# Patient Record
Sex: FEMALE | Race: WHITE | NOT HISPANIC OR LATINO | URBAN - METROPOLITAN AREA
[De-identification: names, ages, dates, MRNs, and addresses within clinical notes are randomized per-mention and may not be internally consistent; named-entity substitution may affect disease eponyms.]

---

## 2020-12-21 ENCOUNTER — OUTPATIENT (OUTPATIENT)
Dept: OUTPATIENT SERVICES | Facility: HOSPITAL | Age: 55
LOS: 1 days | End: 2020-12-21
Payer: COMMERCIAL

## 2020-12-21 DIAGNOSIS — Z20.828 CONTACT WITH AND (SUSPECTED) EXPOSURE TO OTHER VIRAL COMMUNICABLE DISEASES: ICD-10-CM

## 2020-12-21 PROCEDURE — U0003: CPT

## 2020-12-22 DIAGNOSIS — Z20.828 CONTACT WITH AND (SUSPECTED) EXPOSURE TO OTHER VIRAL COMMUNICABLE DISEASES: ICD-10-CM

## 2020-12-23 LAB — SARS-COV-2 RNA SPEC QL NAA+PROBE: SIGNIFICANT CHANGE UP

## 2020-12-28 ENCOUNTER — EMERGENCY (EMERGENCY)
Facility: HOSPITAL | Age: 55
LOS: 0 days | Discharge: HOME | End: 2020-12-28
Attending: EMERGENCY MEDICINE
Payer: COMMERCIAL

## 2020-12-28 ENCOUNTER — OUTPATIENT (OUTPATIENT)
Dept: OUTPATIENT SERVICES | Facility: HOSPITAL | Age: 55
LOS: 1 days | End: 2020-12-28
Payer: COMMERCIAL

## 2020-12-28 VITALS — WEIGHT: 130.07 LBS | HEIGHT: 67 IN

## 2020-12-28 VITALS
HEART RATE: 73 BPM | OXYGEN SATURATION: 97 % | RESPIRATION RATE: 18 BRPM | DIASTOLIC BLOOD PRESSURE: 53 MMHG | SYSTOLIC BLOOD PRESSURE: 103 MMHG | TEMPERATURE: 98 F

## 2020-12-28 DIAGNOSIS — Z20.828 CONTACT WITH AND (SUSPECTED) EXPOSURE TO OTHER VIRAL COMMUNICABLE DISEASES: ICD-10-CM

## 2020-12-28 DIAGNOSIS — R07.9 CHEST PAIN, UNSPECIFIED: ICD-10-CM

## 2020-12-28 DIAGNOSIS — U07.1 COVID-19: ICD-10-CM

## 2020-12-28 LAB — SARS-COV-2 RNA SPEC QL NAA+PROBE: DETECTED

## 2020-12-28 PROCEDURE — U0003: CPT

## 2020-12-28 PROCEDURE — C9803: CPT

## 2020-12-28 PROCEDURE — 93005 ELECTROCARDIOGRAM TRACING: CPT

## 2020-12-28 PROCEDURE — 71045 X-RAY EXAM CHEST 1 VIEW: CPT | Mod: 26

## 2020-12-28 PROCEDURE — 99283 EMERGENCY DEPT VISIT LOW MDM: CPT

## 2020-12-28 PROCEDURE — 71045 X-RAY EXAM CHEST 1 VIEW: CPT

## 2020-12-28 PROCEDURE — 93010 ELECTROCARDIOGRAM REPORT: CPT

## 2020-12-28 NOTE — ED STATDOCS - NSFOLLOWUPINSTRUCTIONS_ED_ALL_ED_FT
What is COVID-19?  Coronavirus disease 2019, or "COVID-19," is an infection caused by a specific virus called SARS-CoV-2. The virus first appeared in late 2019 in the city of [place], [place]. But it has spread quickly since then, and there are now cases in many other places, including [place] and the [place].    People with COVID-19 can have fever, cough, and trouble breathing. Problems with breathing happen when the infection affects the lungs and causes pneumonia (figure 1).    Experts are studying this virus and will continue to learn more about it over time.    How is COVID-19 spread?  COVID-19 mainly spreads from person to person, similar to the flu. This usually happens when a sick person coughs or sneezes near other people. Doctors also think it is possible to get sick if you touch a surface that has the virus on it and then touch your mouth, nose, or eyes.    From what experts know so far, COVID-19 seems to spread most easily when people are showing symptoms. It is possible to spread it without having symptoms, too, but experts don't know how often this happens.    What are the symptoms of COVID-19?  Symptoms usually start a few days after a person is infected with the virus. But in some people it can take even longer for symptoms to appear.    Symptoms can include:    ?Fever    ?Cough    ?Feeling tired    ?Trouble breathing    ?Muscle aches    Although it is less common, some people have other symptoms, such as headache, sore throat, runny nose, or problems with their sense of smell. Some have digestive problems like nausea or diarrhea.    Most people have mild symptoms. Some people have no symptoms at all. But in other people, COVID-19 can lead to serious problems like pneumonia, not getting enough oxygen, heart problems, or even death. This is more common in people who are older or have other health problems like heart disease, diabetes, lung disease, or cancer.    While children can get COVID-19, they seem less likely to have severe symptoms.    Should I see a doctor or nurse?  If you have a fever, cough, or trouble breathing and might have been exposed to COVID-19, call your doctor or nurse. You might have been exposed if any of the following happened within the last 14 days:    ?You had close contact with a person who has the virus – This generally means being within about 6 feet of the person.    ?You lived in, or traveled to, an area where lots of people have the virus – The [place] Centers for Disease Control and Prevention (CDC) has information about which areas are affected. This can be found on their website: www.cdc.gov/coronavirus/2019-ncov/travelers/index.html.    If your symptoms are not severe, it is best to call your doctor, nurse, or clinic before you go in. They can tell you what to do and whether you need to be seen in person. Many people with only mild symptoms can stay home, and away from other people, until they get better. If you do need to go to the clinic or hospital, you will need to put on a face mask. The staff might also have you wait someplace away from other people.    If you are severely ill and need to go to the clinic or hospital right away, you should still call ahead. This way the staff can care for you while taking steps to protect others.    Your doctor or nurse will do an exam and ask about your symptoms. They will also ask questions about any recent travel and whether you have been around anyone who might be sick.    Will I need tests?  If your doctor or nurse suspects you have COVID-19, they might take a sample of fluid from inside your nose, and possibly your mouth, and send it to a lab for testing. If you are coughing up mucus, they might also test a sample of the mucus. These tests can show if you have COVID-19 or another infection.    In some areas, it might not be possible to test everyone who might have been exposed to the virus. If your doctor cannot test you, they might tell you to stay home and away from other people, and call if your symptoms get worse.    Your doctor might also order a chest X-ray or computed tomography (CT) scan to check your lungs.    How is COVID-19 treated?  There is no specific treatment for COVID-19. Many people will be able to stay home while they get better, but people with serious symptoms or other health problems might need to go to the hospital:    ?Mild illness – Most people with COVID-19 have only mild illness and can rest at home until they get better. People with mild symptoms seem to get better after about 2 weeks, but it's not the same for everyone.    If you are recovering from COVID-19, it's important to stay home, and away from other people, until your doctor or nurse tells you it's safe to go back to your normal activities. This decision will depend on how long it has been since you had symptoms, and in some cases, whether you have had a negative test (showing that the virus is no longer in your body).    ?Severe illness – If you have more severe illness, you might need to stay in the hospital, possibly in the intensive care unit (also called the "ICU"). While you are there, you will most likely be in a special "isolation" room. Only medical staff will be allowed in the room, and they will have to wear special gowns, gloves, masks, and eye protection.    The doctors and nurses can monitor and support your breathing and other body functions and make you as comfortable as possible. You might need extra oxygen to help you breathe easily. If you are having a very hard time breathing, you might need to be put on a ventilator. This is a machine to help you breathe.    Doctors are studying several different medicines to learn whether they might work to treat COVID-19. In certain cases, doctors might recommend these medicines.    Can COVID-19 be prevented?  There are things you can do to reduce your chances of getting COVID-19. These steps are a good idea for everyone, especially as the infection is spreading very quickly. But they are extra important for people age 65 years or older or who have other health problems. To help slow the spread of infection:    ?Wash your hands with soap and water often. This is especially important after being in public and touching other people or surfaces. Make sure to rub your hands with soap for at least 20 seconds, cleaning your wrists, fingernails, and in between your fingers. Then rinse your hands and dry them with a paper towel you can throw away.    If you are not near a sink, you can use a hand gel to clean your hands. The gels with at least 60 percent alcohol work the best. But it is better to wash with soap and water if you can.    ?Avoid touching your face with your hands, especially your mouth, nose, or eyes.    ?Try to stay away from people who have any symptoms of the infection.    ?Avoid crowds. If you live in an area where there have been cases of COVID-19, try to stay home as much as you can.    Even if you are healthy, limiting contact with other people can help slow the spread of disease. Experts call this "social distancing." In general, the recommendation is to cancel or postpone large gatherings such as sports events, concerts, festivals, parades, and weddings. But even smaller gatherings can be risky. If you do need to be around other people, be sure to wash your hands often and avoid contact when you can. For example, you can avoid handshakes and high fives, and encourage others to do the same.    ?Some experts recommend avoiding travel to certain countries where there are a lot of cases of COVID-19. Travel recommendations are changing often.    Experts do not recommend wearing a face mask if you are not sick, unless you are caring for someone who has (or might have) COVID-19.    If someone in your home has COVID-19, there are additional things you can do to protect yourself and others:    ?Keep the sick person away from others – The sick person should stay in a separate room and use a separate bathroom if possible. They should also eat in their own room.    ?Use face masks – The sick person should wear a face mask when they are in the same room as other people. If you are caring for the sick person, you can also protect yourself by wearing a face mask when you are in the room. This is especially important if the sick person cannot wear a mask.    ?Wash hands – Wash your hands with soap and water often (see above).    ?Clean often – Here are some specific things that can help:    •Wear disposable gloves when you clean. It's also a good idea to wear gloves when you have to touch the sick person's laundry, dishes, utensils, or trash.    •Regularly clean things that are touched a lot. This includes counters, bedside tables, doorknobs, computers, phones, and bathroom surfaces.    •Clean things in your home with soap and water, but also use disinfectants on appropriate surfaces. Some cleaning products work well to kill bacteria, but not viruses, so it's important to check labels. The [place] Environmental Protection Agency (EPA) has a list of products here: www.epa.gov/pesticide-registration/list-n-disinfectants-use-against-sars-cov-2.    There is not yet a vaccine to prevent COVID-19.    What should I do if there is a COVID-19 outbreak in my area?  The best thing you can do to stay healthy is to wash your hands regularly, avoid close contact with people who are sick, and stay home if you are sick. In addition, to help slow the spread of disease, it's important to follow any official instructions in your area about limiting contact with other people. Even if there are no cases of COVID-19 where you live, that could change in the future.    When a lot of cases of COVID-19 spread through one area, experts call this "community transmission." When this happens, schools or businesses in the area will likely close temporarily, and many events will be canceled. City and state leaders might also tell people to stay at home for some time. There are things you can do to prepare for this. For example, you might be able to work from home. You can also make sure you have a way to get in touch with relatives, neighbors, and others in your area. This way you will be able to receive and share information easily.    Rules and guidelines might be different in different areas. If officials do tell people in your area to stay home or avoid gathering with other people, it's important to take this seriously and follow instructions as best you can. Even if you are not at high risk of getting very sick from COVID-19, you could still pass it along to others. Keeping people away from each other is one of the best ways to control the spread of the virus.    What can I do to cope with stress and anxiety?  It is normal to feel anxious or worried about COVID-19. You can take care of yourself, and your family, by trying to:    ?Take breaks from the news    ?Get regular exercise and eat healthy foods    ?Try to find activities that you enjoy and can do in your home    ?Stay in touch with your friends and family members    Keep in mind that most people do not get severely ill or die from COVID-19. While it helps to be prepared, and it's important to do what you can to lower your risk and help slow the spread of the virus, try not to panic.    Where can I go to learn more?  As we learn more about this virus, expert recommendations will continue to change. Check with your doctor or public health official to get the most updated information about how to protect yourself.    You can also find more information about COVID-19 at the following websites:    ?[place] Centers for Disease Control and Prevention (CDC): www.cdc.gov/COVID19    ?World Health Organization (WHO): www.who.int/emergencies/diseases/novel-coronavirus-2019

## 2020-12-28 NOTE — ED STATDOCS - NS_ ATTENDINGSCRIBEDETAILS _ED_A_ED_FT
I, Hermelindo Lee MD,  performed the initial face to face bedside interview with this patient regarding history of present illness, review of symptoms and relevant past medical, social and family history.  I completed an independent physical examination.    The history, relevant review of systems, past medical and surgical history, medical decision making, and physical examination was documented by the scribe in my presence and I attest to the accuracy of the documentation.

## 2020-12-28 NOTE — ED STATDOCS - PROGRESS NOTE DETAILS
Patient with NAD on CXR, symptoms likely related to COVID19.  Reviewed symptom management and return precautions -Meseret Kinney PA-C

## 2020-12-28 NOTE — ED STATDOCS - PATIENT PORTAL LINK FT
You can access the FollowMyHealth Patient Portal offered by [facility] by registering at the following website: [email or web address]. By joining [facility]’s FollowMyHealth portal, you will also be able to view your health information using other applications (apps) compatible with our system.

## 2020-12-28 NOTE — ED ADULT TRIAGE NOTE - CHIEF COMPLAINT QUOTE
pt with worsening CP and SOB since this morning. pt +COVID 12/24. denies any medical hx pt with worsening CP and SOB since this morning. pt +COVID 12/24. denies any medical hx. o2 sat 98&

## 2020-12-28 NOTE — ED STATDOCS - OBJECTIVE STATEMENT
54 y/o female with no pertinent PMHx presents to the ED c/o pain to lateral chest wall on left side, diagnosed with COVID 12/24. Was exposed by , and came to ED subsequently due to increase in pain. Denies SOB. Requesting imaging.

## 2020-12-29 DIAGNOSIS — Z20.828 CONTACT WITH AND (SUSPECTED) EXPOSURE TO OTHER VIRAL COMMUNICABLE DISEASES: ICD-10-CM

## 2021-01-11 PROBLEM — Z78.9 OTHER SPECIFIED HEALTH STATUS: Chronic | Status: ACTIVE | Noted: 2020-12-28

## 2021-07-15 ENCOUNTER — LABORATORY RESULT (OUTPATIENT)
Age: 56
End: 2021-07-15

## 2021-07-15 ENCOUNTER — APPOINTMENT (OUTPATIENT)
Dept: INTERNAL MEDICINE | Facility: CLINIC | Age: 56
End: 2021-07-15
Payer: COMMERCIAL

## 2021-07-15 VITALS
HEART RATE: 95 BPM | RESPIRATION RATE: 16 BRPM | HEIGHT: 67 IN | OXYGEN SATURATION: 97 % | TEMPERATURE: 97.9 F | DIASTOLIC BLOOD PRESSURE: 76 MMHG | BODY MASS INDEX: 21.03 KG/M2 | WEIGHT: 134 LBS | SYSTOLIC BLOOD PRESSURE: 104 MMHG

## 2021-07-15 DIAGNOSIS — J30.2 OTHER SEASONAL ALLERGIC RHINITIS: ICD-10-CM

## 2021-07-15 DIAGNOSIS — Z82.49 FAMILY HISTORY OF ISCHEMIC HEART DISEASE AND OTHER DISEASES OF THE CIRCULATORY SYSTEM: ICD-10-CM

## 2021-07-15 DIAGNOSIS — Z78.9 OTHER SPECIFIED HEALTH STATUS: ICD-10-CM

## 2021-07-15 DIAGNOSIS — M54.5 LOW BACK PAIN: ICD-10-CM

## 2021-07-15 PROCEDURE — 99072 ADDL SUPL MATRL&STAF TM PHE: CPT

## 2021-07-15 PROCEDURE — 99205 OFFICE O/P NEW HI 60 MIN: CPT

## 2021-07-15 NOTE — HISTORY OF PRESENT ILLNESS
[FreeTextEntry1] : The patient comes in for a yearly wellness evaluation and new patient consultation.\par  [de-identified] : The patient is a 56-year-old white female, in good medical health, who presents for an initial evaluation.\par \par The patient was in her usual state of health until one month ago, when she developed URI-type symptoms. She had a dry cough for approximately 3 weeks, she then began to produce clear sputum for one week. She went to an urgent care center where a chest x-ray was apparently negative. She was started on prednisone 3 days ago in a dosage of 40 mg daily. She was also given Tessalon Perles. The cough is much improved after only a few days on this medication. She denies a history of asthma. She does have seasonal allergies which usually occur in the fall and in the spring. They are usually controlled with over-the-counter preparations as needed.\par \par The patient notes that she denies any recent fevers, chest pain, chest tightness, palpitations, loss of appetite, major fluctuations in her weight, dyspnea, dyspnea on exertion, or change in bowel or urinary habits. She does exercise by riding a bicycle 3 times a week.\par \par Patient's pulmonary history is remarkable for approximately 20 pack years of smoking. She quit smoking when she was in her late 40s. She did occasionally use a Vaping device approximately once a month, but she stopped last year. She denies a past history of pneumonia, TB or TB exposure. She denies ankle swelling, paroxysmal nocturnal dyspnea, orthopnea, or toxic exposure. She has traveled to [place] in the past 2 years. She does have pet dogs at home.\par \par The patient was seen by a cardiologist for atypical chest pain last year. She underwent a complete workup including an echocardiogram, carotid duplex study, and a nuclear stress test, all of which were negative. She was told that there was no further intervention that was needed.\par \par Sluema, the patient had a questionable history of lupus many years ago. Her course may have been complicated by seizures. Subsequently, all of her symptoms resolve completely and she is not on treatment and she remains totally asymptomatic. She now comes in for assessment

## 2021-07-15 NOTE — PHYSICAL EXAM
[No Acute Distress] : no acute distress [Well Nourished] : well nourished [Well Developed] : well developed [Well-Appearing] : well-appearing [Normal Sclera/Conjunctiva] : normal sclera/conjunctiva [EOMI] : extraocular movements intact [Normal Outer Ear/Nose] : the outer ears and nose were normal in appearance [Normal Oropharynx] : the oropharynx was normal [No JVD] : no jugular venous distention [Supple] : supple [Thyroid Normal, No Nodules] : the thyroid was normal and there were no nodules present [No Respiratory Distress] : no respiratory distress  [No Accessory Muscle Use] : no accessory muscle use [Clear to Auscultation] : lungs were clear to auscultation bilaterally [Normal Rate] : normal rate  [Regular Rhythm] : with a regular rhythm [Normal S1, S2] : normal S1 and S2 [No Murmur] : no murmur heard [No Edema] : there was no peripheral edema [No Extremity Clubbing/Cyanosis] : no extremity clubbing/cyanosis [Soft] : abdomen soft [Non Tender] : non-tender [Non-distended] : non-distended [No Masses] : no abdominal mass palpated [No HSM] : no HSM [Normal Bowel Sounds] : normal bowel sounds [Normal Supraclavicular Nodes] : no supraclavicular lymphadenopathy [Normal Posterior Cervical Nodes] : no posterior cervical lymphadenopathy [Normal Anterior Cervical Nodes] : no anterior cervical lymphadenopathy [No CVA Tenderness] : no CVA  tenderness [No Spinal Tenderness] : no spinal tenderness [No Joint Swelling] : no joint swelling [Grossly Normal Strength/Tone] : grossly normal strength/tone [No Rash] : no rash [Coordination Grossly Intact] : coordination grossly intact [Normal Gait] : normal gait [Normal Affect] : the affect was normal [Normal Insight/Judgement] : insight and judgment were intact

## 2021-07-15 NOTE — PLAN
[FreeTextEntry1] : One. The patient will continue the prednisone that was prescribed at the urgent care center in a dosage of 40 mg daily for 2 more days.\par \par 2. The patient has been urged to obtain the corona virus vaccination\par \par 3. Routine fasting blood work to be performed at this time.\par \par 4. Maintain current exercise regimen.\par \par 5. The patient will obtain a new shingles vaccine, one month after her second corona virus vaccination is completed.\par \par 6. Follow up with myself in one year with a wellness evaluation, EKG, routine fasting blood work, and a tetanus booster will be updated at that time as well.\par \par 7. Routine GYN followup on a yearly basis.

## 2021-07-15 NOTE — HEALTH RISK ASSESSMENT
[] : Yes [Yes] : Yes [Monthly or less (1 pt)] : Monthly or less (1 point) [1 or 2 (0 pts)] : 1 or 2 (0 points) [Never (0 pts)] : Never (0 points) [No] : In the past 12 months have you used drugs other than those required for medical reasons? No [0] : 2) Feeling down, depressed, or hopeless: Not at all (0) [Patient reported mammogram was normal] : Patient reported mammogram was normal [Patient reported colonoscopy was normal] : Patient reported colonoscopy was normal [de-identified] : once or twice a year  [MammogramDate] : 06/19 [ColonoscopyDate] : 01/20

## 2021-07-18 LAB
25(OH)D3 SERPL-MCNC: 33.4 NG/ML
ALBUMIN SERPL ELPH-MCNC: 5.3 G/DL
ALP BLD-CCNC: 69 U/L
ALT SERPL-CCNC: 19 U/L
ANION GAP SERPL CALC-SCNC: 14 MMOL/L
APPEARANCE: ABNORMAL
AST SERPL-CCNC: 17 U/L
BACTERIA: NEGATIVE
BASOPHILS # BLD AUTO: 0.04 K/UL
BASOPHILS NFR BLD AUTO: 0.4 %
BILIRUB SERPL-MCNC: 0.4 MG/DL
BILIRUBIN URINE: NEGATIVE
BLOOD URINE: NEGATIVE
BUN SERPL-MCNC: 18 MG/DL
CALCIUM SERPL-MCNC: 10.6 MG/DL
CHLORIDE SERPL-SCNC: 104 MMOL/L
CHOLEST SERPL-MCNC: 259 MG/DL
CO2 SERPL-SCNC: 25 MMOL/L
COLOR: NORMAL
CREAT SERPL-MCNC: 1.09 MG/DL
EOSINOPHIL # BLD AUTO: 0.05 K/UL
EOSINOPHIL NFR BLD AUTO: 0.5 %
ESTIMATED AVERAGE GLUCOSE: 117 MG/DL
GLUCOSE QUALITATIVE U: NEGATIVE
GLUCOSE SERPL-MCNC: 98 MG/DL
HBA1C MFR BLD HPLC: 5.7 %
HCT VFR BLD CALC: 39.3 %
HDLC SERPL-MCNC: 71 MG/DL
HGB BLD-MCNC: 13.4 G/DL
HYALINE CASTS: 3 /LPF
IMM GRANULOCYTES NFR BLD AUTO: 0.1 %
KETONES URINE: NEGATIVE
LDLC SERPL CALC-MCNC: 169 MG/DL
LEUKOCYTE ESTERASE URINE: NEGATIVE
LYMPHOCYTES # BLD AUTO: 2.77 K/UL
LYMPHOCYTES NFR BLD AUTO: 30 %
MAN DIFF?: NORMAL
MCHC RBC-ENTMCNC: 32.8 PG
MCHC RBC-ENTMCNC: 34.1 GM/DL
MCV RBC AUTO: 96.3 FL
MICROSCOPIC-UA: NORMAL
MONOCYTES # BLD AUTO: 0.76 K/UL
MONOCYTES NFR BLD AUTO: 8.2 %
NEUTROPHILS # BLD AUTO: 5.61 K/UL
NEUTROPHILS NFR BLD AUTO: 60.8 %
NITRITE URINE: NEGATIVE
NONHDLC SERPL-MCNC: 188 MG/DL
PH URINE: 6.5
PLATELET # BLD AUTO: 254 K/UL
POTASSIUM SERPL-SCNC: 5.7 MMOL/L
PROT SERPL-MCNC: 7.3 G/DL
PROTEIN URINE: NORMAL
RBC # BLD: 4.08 M/UL
RBC # FLD: 13.4 %
RED BLOOD CELLS URINE: 1 /HPF
SODIUM SERPL-SCNC: 143 MMOL/L
SPECIFIC GRAVITY URINE: 1.02
SQUAMOUS EPITHELIAL CELLS: 12 /HPF
T3FREE SERPL-MCNC: 1.68 PG/ML
T3RU NFR SERPL: 1.1 TBI
T4 FREE SERPL-MCNC: 1.1 NG/DL
T4 SERPL-MCNC: 6.2 UG/DL
TRIGL SERPL-MCNC: 95 MG/DL
TSH SERPL-ACNC: 1.13 UIU/ML
UROBILINOGEN URINE: NORMAL
WBC # FLD AUTO: 9.24 K/UL
WHITE BLOOD CELLS URINE: 4 /HPF

## 2021-07-30 LAB
ANION GAP SERPL CALC-SCNC: 14 MMOL/L
BUN SERPL-MCNC: 17 MG/DL
CALCIUM SERPL-MCNC: 9.8 MG/DL
CALCIUM SERPL-MCNC: 9.8 MG/DL
CHLORIDE SERPL-SCNC: 103 MMOL/L
CO2 SERPL-SCNC: 27 MMOL/L
CREAT SERPL-MCNC: 1.06 MG/DL
GLUCOSE SERPL-MCNC: 92 MG/DL
PARATHYROID HORMONE INTACT: 46 PG/ML
POTASSIUM SERPL-SCNC: 5.4 MMOL/L
SODIUM SERPL-SCNC: 144 MMOL/L

## 2021-08-02 ENCOUNTER — NON-APPOINTMENT (OUTPATIENT)
Age: 56
End: 2021-08-02

## 2021-10-25 DIAGNOSIS — Z01.812 ENCOUNTER FOR PREPROCEDURAL LABORATORY EXAMINATION: ICD-10-CM

## 2021-10-25 DIAGNOSIS — Z20.822 ENCOUNTER FOR PREPROCEDURAL LABORATORY EXAMINATION: ICD-10-CM

## 2022-02-15 ENCOUNTER — APPOINTMENT (OUTPATIENT)
Dept: INTERNAL MEDICINE | Facility: CLINIC | Age: 57
End: 2022-02-15

## 2022-09-06 ENCOUNTER — NON-APPOINTMENT (OUTPATIENT)
Age: 57
End: 2022-09-06

## 2022-09-06 ENCOUNTER — APPOINTMENT (OUTPATIENT)
Dept: INTERNAL MEDICINE | Facility: CLINIC | Age: 57
End: 2022-09-06

## 2022-09-06 VITALS
HEART RATE: 57 BPM | BODY MASS INDEX: 21.5 KG/M2 | WEIGHT: 137 LBS | OXYGEN SATURATION: 97 % | DIASTOLIC BLOOD PRESSURE: 72 MMHG | RESPIRATION RATE: 16 BRPM | SYSTOLIC BLOOD PRESSURE: 108 MMHG | TEMPERATURE: 97.8 F | HEIGHT: 67 IN

## 2022-09-06 DIAGNOSIS — R00.1 BRADYCARDIA, UNSPECIFIED: ICD-10-CM

## 2022-09-06 DIAGNOSIS — Z00.00 ENCOUNTER FOR GENERAL ADULT MEDICAL EXAMINATION W/OUT ABNORMAL FINDINGS: ICD-10-CM

## 2022-09-06 DIAGNOSIS — M54.12 RADICULOPATHY, CERVICAL REGION: ICD-10-CM

## 2022-09-06 PROCEDURE — 93000 ELECTROCARDIOGRAM COMPLETE: CPT | Mod: 59

## 2022-09-06 PROCEDURE — 99396 PREV VISIT EST AGE 40-64: CPT | Mod: 25

## 2022-09-06 NOTE — HISTORY OF PRESENT ILLNESS
[FreeTextEntry1] : The patient presents for a comprehensive evaluation.  [de-identified] : The patient is doing well. Her appetite has been normal and she has been exercising regularly. She tends to walk and ride her bike. \par \par She hasn't had any issues with her allergies, as of late. \par \par Reports that she has had an L4-L5 spinal fusion and C6-C7 herniation. She is now having tingling in her right upper extremity. She has had a similar issue in the past, but it resolved on its own in 2003. The tingling does not change with her position in bed. \par \par She last saw her GYN, Dr. Vee, in 2020.\par \par Her colonoscopy was in 2020. \par \par She had 2x Pfizer COVID-19 vaccines, but no boosters. She is not UTD on shingles or TDAP. \par \par Denies fever, chills, nigh sweats, chest pain, angina, SOB, SOB on exertion, orthopnea, PND, change in bowel movements, or change in urination. No other medical complaints at this time.

## 2022-09-06 NOTE — HEALTH RISK ASSESSMENT
[Never] : Never [Yes] : Yes [Monthly or less (1 pt)] : Monthly or less (1 point) [1 or 2 (0 pts)] : 1 or 2 (0 points) [Never (0 pts)] : Never (0 points) [No] : In the past 12 months have you used drugs other than those required for medical reasons? No [0] : 2) Feeling down, depressed, or hopeless: Not at all (0) [Patient reported mammogram was normal] : Patient reported mammogram was normal [Patient reported PAP Smear was normal] : Patient reported PAP Smear was normal [Patient reported colonoscopy was normal] : Patient reported colonoscopy was normal [de-identified] : rarely [MammogramDate] : 01/19 [PapSmearDate] : 01/20 [ColonoscopyDate] : 01/20

## 2022-09-06 NOTE — DATA REVIEWED
[FreeTextEntry1] : EKG shows sinus bradycardia at a rate of 54.  There are normal intervals.  The axis is normal.  There are no acute ST-T wave changes noted

## 2022-09-06 NOTE — PLAN
[FreeTextEntry1] : 1.  With regards to the cervical radiculopathy, she would like to continue to observe at this time.  Given the fact that it resolved on its own several years ago, she would like to give it some more time.  I have offered her the option of a Medrol Dosepak, but she is not interested at present.  If the symptoms persist, she will then reconsider this option, as well as possible neurological or neurosurgical interventions.\par \par 2. Follow up with Dr. Vee for a GYN assessment. \par \par 3. Continue exercising regularly. \par \par 4. I advised her to get the COVID-19 booster in the fall. \par \par 5. We will send her for blood work today and I will call her with the results. \par \par 6. I recommended she have the TDAP and Shingrix vaccines. \par \par 7. Follow up in 1 year for wellness evaluation, EKG and blood work.

## 2022-09-08 DIAGNOSIS — E87.5 HYPERKALEMIA: ICD-10-CM

## 2022-09-08 DIAGNOSIS — E87.0 HYPEROSMOLALITY AND HYPERNATREMIA: ICD-10-CM

## 2022-09-08 LAB
25(OH)D3 SERPL-MCNC: 46.4 NG/ML
ALBUMIN SERPL ELPH-MCNC: 4.9 G/DL
ALP BLD-CCNC: 61 U/L
ALT SERPL-CCNC: 22 U/L
ANION GAP SERPL CALC-SCNC: 10 MMOL/L
APPEARANCE: CLEAR
AST SERPL-CCNC: 24 U/L
BACTERIA: NEGATIVE
BASOPHILS # BLD AUTO: 0.06 K/UL
BASOPHILS NFR BLD AUTO: 1 %
BILIRUB SERPL-MCNC: 0.3 MG/DL
BILIRUBIN URINE: NEGATIVE
BLOOD URINE: NEGATIVE
BUN SERPL-MCNC: 11 MG/DL
CALCIUM SERPL-MCNC: 10 MG/DL
CHLORIDE SERPL-SCNC: 106 MMOL/L
CHOLEST SERPL-MCNC: 256 MG/DL
CO2 SERPL-SCNC: 30 MMOL/L
COLOR: NORMAL
CREAT SERPL-MCNC: 0.89 MG/DL
EGFR: 75 ML/MIN/1.73M2
EOSINOPHIL # BLD AUTO: 0.14 K/UL
EOSINOPHIL NFR BLD AUTO: 2.4 %
ESTIMATED AVERAGE GLUCOSE: 123 MG/DL
GLUCOSE QUALITATIVE U: NEGATIVE
GLUCOSE SERPL-MCNC: 98 MG/DL
HBA1C MFR BLD HPLC: 5.9 %
HCT VFR BLD CALC: 42.6 %
HDLC SERPL-MCNC: 68 MG/DL
HGB BLD-MCNC: 13.6 G/DL
HYALINE CASTS: 1 /LPF
IMM GRANULOCYTES NFR BLD AUTO: 0.2 %
KETONES URINE: NEGATIVE
LDLC SERPL CALC-MCNC: 164 MG/DL
LEUKOCYTE ESTERASE URINE: NEGATIVE
LYMPHOCYTES # BLD AUTO: 1.84 K/UL
LYMPHOCYTES NFR BLD AUTO: 31.6 %
MAN DIFF?: NORMAL
MCHC RBC-ENTMCNC: 29.8 PG
MCHC RBC-ENTMCNC: 31.9 GM/DL
MCV RBC AUTO: 93.4 FL
MICROSCOPIC-UA: NORMAL
MONOCYTES # BLD AUTO: 0.47 K/UL
MONOCYTES NFR BLD AUTO: 8.1 %
NEUTROPHILS # BLD AUTO: 3.31 K/UL
NEUTROPHILS NFR BLD AUTO: 56.7 %
NITRITE URINE: NEGATIVE
NONHDLC SERPL-MCNC: 188 MG/DL
PH URINE: 6
PLATELET # BLD AUTO: 256 K/UL
POTASSIUM SERPL-SCNC: 5.8 MMOL/L
PROT SERPL-MCNC: 6.6 G/DL
PROTEIN URINE: NEGATIVE
RBC # BLD: 4.56 M/UL
RBC # FLD: 12.9 %
RED BLOOD CELLS URINE: 1 /HPF
SODIUM SERPL-SCNC: 146 MMOL/L
SPECIFIC GRAVITY URINE: 1.01
SQUAMOUS EPITHELIAL CELLS: 1 /HPF
T3FREE SERPL-MCNC: 2.6 PG/ML
T4 FREE SERPL-MCNC: 1.1 NG/DL
TRIGL SERPL-MCNC: 122 MG/DL
TSH SERPL-ACNC: 2.76 UIU/ML
UROBILINOGEN URINE: NORMAL
WBC # FLD AUTO: 5.83 K/UL
WHITE BLOOD CELLS URINE: 1 /HPF

## 2023-03-21 ENCOUNTER — LABORATORY RESULT (OUTPATIENT)
Age: 58
End: 2023-03-21

## 2023-03-21 ENCOUNTER — APPOINTMENT (OUTPATIENT)
Dept: INTERNAL MEDICINE | Facility: CLINIC | Age: 58
End: 2023-03-21
Payer: COMMERCIAL

## 2023-03-21 VITALS
HEIGHT: 67 IN | DIASTOLIC BLOOD PRESSURE: 62 MMHG | SYSTOLIC BLOOD PRESSURE: 96 MMHG | BODY MASS INDEX: 19.95 KG/M2 | TEMPERATURE: 98.6 F | WEIGHT: 127.1 LBS | OXYGEN SATURATION: 96 % | HEART RATE: 96 BPM

## 2023-03-21 DIAGNOSIS — F41.9 ANXIETY DISORDER, UNSPECIFIED: ICD-10-CM

## 2023-03-21 DIAGNOSIS — M32.19 OTHER ORGAN OR SYSTEM INVOLVEMENT IN SYSTEMIC LUPUS ERYTHEMATOSUS: ICD-10-CM

## 2023-03-21 DIAGNOSIS — S00.12XA CONTUSION OF LEFT EYELID AND PERIOCULAR AREA, INITIAL ENCOUNTER: ICD-10-CM

## 2023-03-21 PROCEDURE — 99214 OFFICE O/P EST MOD 30 MIN: CPT

## 2023-03-21 NOTE — PLAN
[FreeTextEntry1] : 1. Full labs drawn in office today, will follow results \par \par 2. The patient will obtain MRI of head to further evaluate her symptoms above\par \par 3. The patient has been referred to neurology and rheumatology to r/o lupus vs lupus cerbritis vs siezure disorder\par \par 4. Patient will f/u 9/2023 for CPE or sooner if needed

## 2023-03-21 NOTE — HISTORY OF PRESENT ILLNESS
[FreeTextEntry8] : Patient is a 57-year-old female who presents to office today for evaluation of syncope and spontaneous bruising of L eye. The patient states approx 1 week ago, while giving her  a hug, she became faint, lost consciousness, and had assisted fall to floor. Loss of consciousness lasted for few seconds. She was noted at this time to have seizure like activity with arm jerking and eye rolling. She did not have loss of bowel or bladder. The patient states she has had seizure like activity in past, but it has been several years since her last seizure. It was felt she may have had lupus but was never treated. Reports she was worked at [employer]. No seizure like activity since, but does report increased fatigue.\par \par 4 days ago, the patient reports she had significant bruising under her left eye upon waking up. Denies trauma, injury. Denies recent illness. Denies forceful coughing or sneezing. She denies bleeding from nose or gums. She denies any blood noted in her conjunctiva. She does not take any medications or supplements. \par \par The patient feels well today, no acute complaints otherwise.

## 2023-03-21 NOTE — PHYSICAL EXAM
[No Acute Distress] : no acute distress [Normal Sclera/Conjunctiva] : normal sclera/conjunctiva [Normal Oropharynx] : the oropharynx was normal [Normal TMs] : both tympanic membranes were normal [No Lymphadenopathy] : no lymphadenopathy [Supple] : supple [No Respiratory Distress] : no respiratory distress  [No Accessory Muscle Use] : no accessory muscle use [Clear to Auscultation] : lungs were clear to auscultation bilaterally [Normal Rate] : normal rate  [Regular Rhythm] : with a regular rhythm [Normal S1, S2] : normal S1 and S2 [No Edema] : there was no peripheral edema [Soft] : abdomen soft [Non Tender] : non-tender [Non-distended] : non-distended [Normal Bowel Sounds] : normal bowel sounds [Coordination Grossly Intact] : coordination grossly intact [No Focal Deficits] : no focal deficits [Normal Gait] : normal gait [Normal Affect] : the affect was normal [de-identified] : +Bruising noted underneath L eye [de-identified] : normal strength upper and lower ext

## 2023-03-22 ENCOUNTER — OUTPATIENT (OUTPATIENT)
Dept: OUTPATIENT SERVICES | Facility: HOSPITAL | Age: 58
LOS: 1 days | End: 2023-03-22
Payer: COMMERCIAL

## 2023-03-22 ENCOUNTER — TRANSCRIPTION ENCOUNTER (OUTPATIENT)
Age: 58
End: 2023-03-22

## 2023-03-22 ENCOUNTER — APPOINTMENT (OUTPATIENT)
Dept: MRI IMAGING | Facility: CLINIC | Age: 58
End: 2023-03-22
Payer: COMMERCIAL

## 2023-03-22 DIAGNOSIS — M32.9 SYSTEMIC LUPUS ERYTHEMATOSUS, UNSPECIFIED: ICD-10-CM

## 2023-03-22 PROCEDURE — 70551 MRI BRAIN STEM W/O DYE: CPT | Mod: 26

## 2023-03-22 PROCEDURE — 70551 MRI BRAIN STEM W/O DYE: CPT

## 2023-03-23 LAB
25(OH)D3 SERPL-MCNC: 25.7 NG/ML
ALBUMIN SERPL ELPH-MCNC: 4.9 G/DL
ALP BLD-CCNC: 71 U/L
ALT SERPL-CCNC: 20 U/L
ANA SER IF-ACNC: NEGATIVE
ANION GAP SERPL CALC-SCNC: 14 MMOL/L
APPEARANCE: CLEAR
AST SERPL-CCNC: 21 U/L
BASOPHILS # BLD AUTO: 0.05 K/UL
BASOPHILS NFR BLD AUTO: 1.2 %
BILIRUB SERPL-MCNC: 0.3 MG/DL
BILIRUBIN URINE: NEGATIVE
BLOOD URINE: NEGATIVE
BUN SERPL-MCNC: 16 MG/DL
CALCIUM SERPL-MCNC: 9.8 MG/DL
CHLORIDE SERPL-SCNC: 105 MMOL/L
CHOLEST SERPL-MCNC: 254 MG/DL
CO2 SERPL-SCNC: 25 MMOL/L
COLOR: NORMAL
CREAT SERPL-MCNC: 0.88 MG/DL
EGFR: 76 ML/MIN/1.73M2
EOSINOPHIL # BLD AUTO: 0.1 K/UL
EOSINOPHIL NFR BLD AUTO: 2.3 %
ERYTHROCYTE [SEDIMENTATION RATE] IN BLOOD BY WESTERGREN METHOD: 9 MM/HR
ESTIMATED AVERAGE GLUCOSE: 120 MG/DL
FOLATE SERPL-MCNC: 11.7 NG/ML
GLUCOSE QUALITATIVE U: NEGATIVE
GLUCOSE SERPL-MCNC: 106 MG/DL
HBA1C MFR BLD HPLC: 5.8 %
HCT VFR BLD CALC: 42.7 %
HDLC SERPL-MCNC: 72 MG/DL
HGB BLD-MCNC: 13.8 G/DL
IMM GRANULOCYTES NFR BLD AUTO: 0.2 %
INR PPP: 1.06 RATIO
IRON SATN MFR SERPL: 30 %
IRON SERPL-MCNC: 90 UG/DL
KETONES URINE: NEGATIVE
LDLC SERPL CALC-MCNC: 169 MG/DL
LEUKOCYTE ESTERASE URINE: ABNORMAL
LYMPHOCYTES # BLD AUTO: 1.54 K/UL
LYMPHOCYTES NFR BLD AUTO: 35.5 %
MAN DIFF?: NORMAL
MCHC RBC-ENTMCNC: 30.5 PG
MCHC RBC-ENTMCNC: 32.3 GM/DL
MCV RBC AUTO: 94.5 FL
MONOCYTES # BLD AUTO: 0.36 K/UL
MONOCYTES NFR BLD AUTO: 8.3 %
NEUTROPHILS # BLD AUTO: 2.28 K/UL
NEUTROPHILS NFR BLD AUTO: 52.5 %
NITRITE URINE: NEGATIVE
NONHDLC SERPL-MCNC: 182 MG/DL
PH URINE: 6.5
PLATELET # BLD AUTO: 206 K/UL
POTASSIUM SERPL-SCNC: 4.6 MMOL/L
PROT SERPL-MCNC: 6.9 G/DL
PROTEIN URINE: NEGATIVE
PT BLD: 12.5 SEC
RBC # BLD: 4.52 M/UL
RBC # FLD: 13.6 %
RHEUMATOID FACT SER QL: 11 IU/ML
SODIUM SERPL-SCNC: 145 MMOL/L
SPECIFIC GRAVITY URINE: 1.01
T PALLIDUM AB SER QL IA: NEGATIVE
T3FREE SERPL-MCNC: 2.84 PG/ML
T4 FREE SERPL-MCNC: 1.2 NG/DL
TIBC SERPL-MCNC: 297 UG/DL
TRIGL SERPL-MCNC: 62 MG/DL
TSH SERPL-ACNC: 2.19 UIU/ML
UIBC SERPL-MCNC: 207 UG/DL
UROBILINOGEN URINE: NORMAL
VIT B12 SERPL-MCNC: 307 PG/ML
WBC # FLD AUTO: 4.34 K/UL

## 2023-04-04 DIAGNOSIS — R68.89 OTHER GENERAL SYMPTOMS AND SIGNS: ICD-10-CM

## 2023-04-05 ENCOUNTER — NON-APPOINTMENT (OUTPATIENT)
Age: 58
End: 2023-04-05

## 2023-04-05 LAB
INFLUENZA A RESULT: NOT DETECTED
INFLUENZA B RESULT: NOT DETECTED
RESP SYN VIRUS RESULT: NOT DETECTED
SARS-COV-2 RESULT: NOT DETECTED

## 2023-09-05 ENCOUNTER — APPOINTMENT (OUTPATIENT)
Dept: NEUROLOGY | Facility: CLINIC | Age: 58
End: 2023-09-05

## 2023-09-12 ENCOUNTER — APPOINTMENT (OUTPATIENT)
Dept: INTERNAL MEDICINE | Facility: CLINIC | Age: 58
End: 2023-09-12

## 2023-10-16 ENCOUNTER — NON-APPOINTMENT (OUTPATIENT)
Age: 58
End: 2023-10-16

## 2023-10-16 ENCOUNTER — APPOINTMENT (OUTPATIENT)
Dept: INTERNAL MEDICINE | Facility: CLINIC | Age: 58
End: 2023-10-16
Payer: COMMERCIAL

## 2023-10-16 VITALS
OXYGEN SATURATION: 96 % | BODY MASS INDEX: 20.56 KG/M2 | TEMPERATURE: 98.6 F | DIASTOLIC BLOOD PRESSURE: 62 MMHG | HEIGHT: 67 IN | SYSTOLIC BLOOD PRESSURE: 96 MMHG | WEIGHT: 131 LBS | HEART RATE: 78 BPM

## 2023-10-16 DIAGNOSIS — Z00.00 ENCOUNTER FOR GENERAL ADULT MEDICAL EXAMINATION W/OUT ABNORMAL FINDINGS: ICD-10-CM

## 2023-10-16 DIAGNOSIS — E78.5 HYPERLIPIDEMIA, UNSPECIFIED: ICD-10-CM

## 2023-10-16 DIAGNOSIS — Z87.891 PERSONAL HISTORY OF NICOTINE DEPENDENCE: ICD-10-CM

## 2023-10-16 DIAGNOSIS — Z23 ENCOUNTER FOR IMMUNIZATION: ICD-10-CM

## 2023-10-16 PROCEDURE — 90471 IMMUNIZATION ADMIN: CPT

## 2023-10-16 PROCEDURE — 99396 PREV VISIT EST AGE 40-64: CPT | Mod: 25

## 2023-10-16 PROCEDURE — 90715 TDAP VACCINE 7 YRS/> IM: CPT

## 2023-10-16 PROCEDURE — 99214 OFFICE O/P EST MOD 30 MIN: CPT | Mod: 25

## 2023-10-25 ENCOUNTER — APPOINTMENT (OUTPATIENT)
Dept: ULTRASOUND IMAGING | Facility: CLINIC | Age: 58
End: 2023-10-25
Payer: COMMERCIAL

## 2023-10-25 ENCOUNTER — OUTPATIENT (OUTPATIENT)
Dept: OUTPATIENT SERVICES | Facility: HOSPITAL | Age: 58
LOS: 1 days | End: 2023-10-25
Payer: MEDICARE

## 2023-10-25 DIAGNOSIS — Z00.8 ENCOUNTER FOR OTHER GENERAL EXAMINATION: ICD-10-CM

## 2023-10-25 DIAGNOSIS — M54.6 PAIN IN THORACIC SPINE: ICD-10-CM

## 2023-10-25 DIAGNOSIS — E78.5 HYPERLIPIDEMIA, UNSPECIFIED: ICD-10-CM

## 2023-10-25 DIAGNOSIS — I65.29 OCCLUSION AND STENOSIS OF UNSPECIFIED CAROTID ARTERY: ICD-10-CM

## 2023-10-25 PROCEDURE — 93880 EXTRACRANIAL BILAT STUDY: CPT | Mod: 26

## 2023-10-25 PROCEDURE — 93880 EXTRACRANIAL BILAT STUDY: CPT

## 2023-10-26 DIAGNOSIS — I65.29 OCCLUSION AND STENOSIS OF UNSPECIFIED CAROTID ARTERY: ICD-10-CM

## 2023-10-30 LAB
CHOLEST SERPL-MCNC: 232 MG/DL
HDLC SERPL-MCNC: 63 MG/DL
LDLC SERPL CALC-MCNC: 155 MG/DL
NONHDLC SERPL-MCNC: 169 MG/DL
TRIGL SERPL-MCNC: 81 MG/DL

## 2024-01-08 ENCOUNTER — NON-APPOINTMENT (OUTPATIENT)
Age: 59
End: 2024-01-08

## 2024-01-08 DIAGNOSIS — Z20.828 CONTACT WITH AND (SUSPECTED) EXPOSURE TO OTHER VIRAL COMMUNICABLE DISEASES: ICD-10-CM

## 2024-01-09 LAB
RAPID RVP RESULT: NOT DETECTED
SARS-COV-2 RNA PNL RESP NAA+PROBE: NOT DETECTED

## 2024-10-21 ENCOUNTER — APPOINTMENT (OUTPATIENT)
Dept: INTERNAL MEDICINE | Facility: CLINIC | Age: 59
End: 2024-10-21